# Patient Record
Sex: MALE | Race: WHITE | Employment: FULL TIME | URBAN - METROPOLITAN AREA
[De-identification: names, ages, dates, MRNs, and addresses within clinical notes are randomized per-mention and may not be internally consistent; named-entity substitution may affect disease eponyms.]

---

## 2017-07-08 ENCOUNTER — HOSPITAL ENCOUNTER (OUTPATIENT)
Age: 52
Discharge: HOME | End: 2017-07-08
Attending: EMERGENCY MEDICINE
Payer: COMMERCIAL

## 2017-07-08 VITALS
DIASTOLIC BLOOD PRESSURE: 98 MMHG | TEMPERATURE: 99 F | SYSTOLIC BLOOD PRESSURE: 139 MMHG | WEIGHT: 215 LBS | HEIGHT: 71 IN | RESPIRATION RATE: 16 BRPM | HEART RATE: 106 BPM | BODY MASS INDEX: 30.1 KG/M2

## 2017-07-08 DIAGNOSIS — J40 BRONCHITIS: Primary | ICD-10-CM

## 2017-07-08 PROCEDURE — 99213 OFFICE O/P EST LOW 20 MIN: CPT

## 2017-07-08 PROCEDURE — 99204 OFFICE O/P NEW MOD 45 MIN: CPT

## 2017-07-08 RX ORDER — IBUPROFEN 600 MG/1
600 TABLET ORAL ONCE
Status: COMPLETED | OUTPATIENT
Start: 2017-07-08 | End: 2017-07-08

## 2017-07-08 RX ORDER — METHYLPREDNISOLONE 4 MG/1
TABLET ORAL
Qty: 1 PACKAGE | Refills: 0 | Status: SHIPPED | OUTPATIENT
Start: 2017-07-08 | End: 2018-04-10 | Stop reason: ALTCHOICE

## 2017-07-08 RX ORDER — AZITHROMYCIN 250 MG/1
TABLET, FILM COATED ORAL
Qty: 1 PACKAGE | Refills: 0 | Status: SHIPPED | OUTPATIENT
Start: 2017-07-08 | End: 2017-07-13

## 2017-07-08 NOTE — ED INITIAL ASSESSMENT (HPI)
X1 WK Pt c/o productive cough, headache, sore throat. \"Mucinex helps but it continues to worsen. \"  Denies fevers

## 2017-07-08 NOTE — ED PROVIDER NOTES
Patient presents with:  Cough/URI  Sore Throat    HPI:     Loa Scheuermann is a 45year old male who presents with chief complaint of sore throat, cough, congestion. Former smoker. Started with sore throat, congestion and cough about a week ago.   The cou declined an inhaler and preferred not to take codeine based cough syrup. May continue with mucinex or an antihistamine. May consider robitussin PM as well to aid with cough at night. Assessment/Plan:     Diagnosis:  Bronchitis     Plan:  1.   Medrol

## 2017-08-21 ENCOUNTER — OFFICE VISIT (OUTPATIENT)
Dept: FAMILY MEDICINE CLINIC | Facility: CLINIC | Age: 52
End: 2017-08-21

## 2017-08-21 ENCOUNTER — HOSPITAL ENCOUNTER (OUTPATIENT)
Dept: ULTRASOUND IMAGING | Age: 52
Discharge: HOME | End: 2017-08-21
Attending: FAMILY MEDICINE
Payer: COMMERCIAL

## 2017-08-21 VITALS
TEMPERATURE: 99 F | BODY MASS INDEX: 30 KG/M2 | DIASTOLIC BLOOD PRESSURE: 88 MMHG | HEART RATE: 92 BPM | WEIGHT: 213 LBS | SYSTOLIC BLOOD PRESSURE: 138 MMHG | RESPIRATION RATE: 16 BRPM | OXYGEN SATURATION: 98 %

## 2017-08-21 DIAGNOSIS — N50.812 LEFT TESTICULAR PAIN: ICD-10-CM

## 2017-08-21 DIAGNOSIS — N50.812 LEFT TESTICULAR PAIN: Primary | ICD-10-CM

## 2017-08-21 PROCEDURE — 93975 VASCULAR STUDY: CPT | Performed by: FAMILY MEDICINE

## 2017-08-21 PROCEDURE — 76870 US EXAM SCROTUM: CPT | Performed by: FAMILY MEDICINE

## 2017-08-21 PROCEDURE — 99214 OFFICE O/P EST MOD 30 MIN: CPT | Performed by: FAMILY MEDICINE

## 2017-08-21 NOTE — PROGRESS NOTES
Lidia Gunter is a 45year old male. CC:  Patient presents with:  Pain: pain in groin area for 10 days per pt      HPI:  L testicular pain for 10 days, a bit better over the past 2 days. No hematuria or dysuria. No abd pain. No diarrhea. No fever.  Earnstine Beverage M.D.    Physical Exam:  GEN: well developed, well nourished, in no apparent distress  EYE: B conjunctiva and lids normal  HENT: No oral lesions.    NECK: No lymphadenopathy, thyromegaly or masses  CAR: S1, S2 normal, RRR; no S3, no S4; no click; murmur nega

## 2018-04-09 RX ORDER — LISINOPRIL 10 MG/1
TABLET ORAL
Qty: 30 TABLET | Refills: 3 | Status: SHIPPED | OUTPATIENT
Start: 2018-04-09 | End: 2018-06-28

## 2018-04-10 ENCOUNTER — TELEPHONE (OUTPATIENT)
Dept: FAMILY MEDICINE CLINIC | Facility: CLINIC | Age: 53
End: 2018-04-10

## 2018-04-10 DIAGNOSIS — Z00.00 WELL ADULT EXAM: Primary | ICD-10-CM

## 2018-04-10 DIAGNOSIS — I10 ESSENTIAL HYPERTENSION: ICD-10-CM

## 2018-04-10 DIAGNOSIS — E78.5 HYPERLIPIDEMIA, UNSPECIFIED HYPERLIPIDEMIA TYPE: ICD-10-CM

## 2018-04-13 ENCOUNTER — PRIOR ORIGINAL RECORDS (OUTPATIENT)
Dept: OTHER | Age: 53
End: 2018-04-13

## 2018-04-13 ENCOUNTER — NURSE ONLY (OUTPATIENT)
Dept: FAMILY MEDICINE CLINIC | Facility: CLINIC | Age: 53
End: 2018-04-13

## 2018-04-13 DIAGNOSIS — Z00.00 WELL ADULT EXAM: ICD-10-CM

## 2018-04-13 DIAGNOSIS — E78.5 HYPERLIPIDEMIA, UNSPECIFIED HYPERLIPIDEMIA TYPE: ICD-10-CM

## 2018-04-13 DIAGNOSIS — I10 ESSENTIAL HYPERTENSION: ICD-10-CM

## 2018-04-13 PROCEDURE — 80050 GENERAL HEALTH PANEL: CPT | Performed by: FAMILY MEDICINE

## 2018-04-13 PROCEDURE — 84153 ASSAY OF PSA TOTAL: CPT | Performed by: FAMILY MEDICINE

## 2018-04-13 PROCEDURE — 36415 COLL VENOUS BLD VENIPUNCTURE: CPT | Performed by: FAMILY MEDICINE

## 2018-04-13 PROCEDURE — 80061 LIPID PANEL: CPT | Performed by: FAMILY MEDICINE

## 2018-04-16 ENCOUNTER — TELEPHONE (OUTPATIENT)
Dept: FAMILY MEDICINE CLINIC | Facility: CLINIC | Age: 53
End: 2018-04-16

## 2018-04-16 NOTE — TELEPHONE ENCOUNTER
----- Message from Aaron Murcia MD sent at 4/16/2018  7:56 AM CDT -----  Please let patient know that the prostate, sugar, electrolyte, liver, kidney, and thyroid, tests were fine. There is no anemia and the white blood cell count is fine.    His total ch

## 2018-04-20 ENCOUNTER — TELEPHONE (OUTPATIENT)
Dept: FAMILY MEDICINE CLINIC | Facility: CLINIC | Age: 53
End: 2018-04-20

## 2018-04-20 PROBLEM — R93.1 ABNORMAL HEART SCORE CT: Status: ACTIVE | Noted: 2018-04-20

## 2018-04-20 RX ORDER — ATORVASTATIN CALCIUM 20 MG/1
20 TABLET, FILM COATED ORAL NIGHTLY
Qty: 30 TABLET | Refills: 3 | Status: SHIPPED | OUTPATIENT
Start: 2018-04-20 | End: 2018-08-15

## 2018-04-20 NOTE — TELEPHONE ENCOUNTER
Patient advised of the CT heart score results and recommendations for the ASA and the starting of the Lipitor. Patient would like the prescription sent to [address] [place] [address]. Information for Dr. Orestes Ayala provided to patient.    Information faxed to Dr. West

## 2018-04-20 NOTE — TELEPHONE ENCOUNTER
Please let patient know or leave message that his CT heart score done at [facility] on 4/19/18 was 291. We like to see the score < 10. Based on risk stratification he is considered at moderate to high risk for having coronary artery narrowing.  I recommend he s

## 2018-06-28 ENCOUNTER — OFFICE VISIT (OUTPATIENT)
Dept: FAMILY MEDICINE CLINIC | Facility: CLINIC | Age: 53
End: 2018-06-28

## 2018-06-28 VITALS
DIASTOLIC BLOOD PRESSURE: 100 MMHG | WEIGHT: 218.81 LBS | TEMPERATURE: 97 F | HEIGHT: 69 IN | RESPIRATION RATE: 16 BRPM | BODY MASS INDEX: 32.41 KG/M2 | HEART RATE: 72 BPM | SYSTOLIC BLOOD PRESSURE: 148 MMHG

## 2018-06-28 DIAGNOSIS — R93.1 ABNORMAL HEART SCORE CT: ICD-10-CM

## 2018-06-28 DIAGNOSIS — R07.9 CHEST PAIN, UNSPECIFIED TYPE: ICD-10-CM

## 2018-06-28 DIAGNOSIS — E78.5 HYPERLIPIDEMIA, UNSPECIFIED HYPERLIPIDEMIA TYPE: ICD-10-CM

## 2018-06-28 DIAGNOSIS — I10 HYPERTENSION, UNSPECIFIED TYPE: Primary | ICD-10-CM

## 2018-06-28 PROCEDURE — 99214 OFFICE O/P EST MOD 30 MIN: CPT | Performed by: FAMILY MEDICINE

## 2018-06-28 RX ORDER — LISINOPRIL 10 MG/1
20 TABLET ORAL
COMMUNITY
Start: 2018-06-28 | End: 2018-07-05 | Stop reason: DRUGHIGH

## 2018-06-28 NOTE — PROGRESS NOTES
Irene Umana is a 47year old male. CC:  Patient presents with:   Follow - Up: per pt      HPI:  Here to follow up hypertension  Home BP readings: 120-140/  Medication side effects: none  Chest pain: yes, can occur at rest or exertion, is it n Comment: social       ROS:  General: energy level stable  GI: Denies abdominal pain    Vitals: BP (!) 148/100   Pulse 72   Temp 97.2 °F (36.2 °C) (Temporal)   Resp 16   Ht 69\"   Wt 218 lb 12.8 oz   BMI 32.31 kg/m²    Reviewed by Juan A Kilpatrick M.D.

## 2018-07-02 ENCOUNTER — NURSE ONLY (OUTPATIENT)
Dept: FAMILY MEDICINE CLINIC | Facility: CLINIC | Age: 53
End: 2018-07-02

## 2018-07-02 ENCOUNTER — TELEPHONE (OUTPATIENT)
Dept: FAMILY MEDICINE CLINIC | Facility: CLINIC | Age: 53
End: 2018-07-02

## 2018-07-02 ENCOUNTER — PRIOR ORIGINAL RECORDS (OUTPATIENT)
Dept: OTHER | Age: 53
End: 2018-07-02

## 2018-07-02 DIAGNOSIS — E78.5 HYPERLIPIDEMIA, UNSPECIFIED HYPERLIPIDEMIA TYPE: ICD-10-CM

## 2018-07-02 DIAGNOSIS — I10 HYPERTENSION, UNSPECIFIED TYPE: ICD-10-CM

## 2018-07-02 DIAGNOSIS — R07.9 CHEST PAIN, UNSPECIFIED TYPE: ICD-10-CM

## 2018-07-02 LAB
ALBUMIN SERPL-MCNC: 4 G/DL (ref 3.5–4.8)
ALP LIVER SERPL-CCNC: 60 U/L (ref 45–117)
ALT SERPL-CCNC: 48 U/L (ref 17–63)
AST SERPL-CCNC: 23 U/L (ref 15–41)
BILIRUB SERPL-MCNC: 0.5 MG/DL (ref 0.1–2)
BUN BLD-MCNC: 16 MG/DL (ref 8–20)
CALCIUM BLD-MCNC: 9.2 MG/DL (ref 8.3–10.3)
CHLORIDE: 107 MMOL/L (ref 101–111)
CHOLEST SMN-MCNC: 167 MG/DL (ref ?–200)
CO2: 25 MMOL/L (ref 22–32)
CREAT BLD-MCNC: 1.11 MG/DL (ref 0.7–1.3)
GLUCOSE BLD-MCNC: 99 MG/DL (ref 70–99)
HDLC SERPL-MCNC: 81 MG/DL (ref 45–?)
HDLC SERPL: 2.06 {RATIO} (ref ?–4.97)
LDLC SERPL CALC-MCNC: 70 MG/DL (ref ?–130)
M PROTEIN MFR SERPL ELPH: 7.6 G/DL (ref 6.1–8.3)
NONHDLC SERPL-MCNC: 86 MG/DL (ref ?–130)
POTASSIUM SERPL-SCNC: 4.5 MMOL/L (ref 3.6–5.1)
SODIUM SERPL-SCNC: 141 MMOL/L (ref 136–144)
TRIGL SERPL-MCNC: 80 MG/DL (ref ?–150)
VLDLC SERPL CALC-MCNC: 16 MG/DL (ref 5–40)

## 2018-07-02 PROCEDURE — 36415 COLL VENOUS BLD VENIPUNCTURE: CPT | Performed by: FAMILY MEDICINE

## 2018-07-02 PROCEDURE — 80053 COMPREHEN METABOLIC PANEL: CPT | Performed by: FAMILY MEDICINE

## 2018-07-02 PROCEDURE — 80061 LIPID PANEL: CPT | Performed by: FAMILY MEDICINE

## 2018-07-02 NOTE — PROGRESS NOTES
1 mint tube collected from L AC using straight needle and 1 attempt    Pt tolerated and was sent home in stable condition

## 2018-07-02 NOTE — TELEPHONE ENCOUNTER
ordered Stress Echo, however, order is denied. Test needs to be ordered as a result of High BP or lung issues - MD did not state this is the reasoning for the test. For peer to peer, MD may call [phone] within 10 days.  Will send fax with this info

## 2018-07-02 NOTE — TELEPHONE ENCOUNTER
Mich Gonsales from Dr Melville Libman office is all to get the results from pt's Heart score test. Please Fax over [phone]

## 2018-07-03 ENCOUNTER — MED REC SCAN ONLY (OUTPATIENT)
Dept: FAMILY MEDICINE CLINIC | Facility: CLINIC | Age: 53
End: 2018-07-03

## 2018-07-03 NOTE — TELEPHONE ENCOUNTER
Called [facility] - [phone]. Spoke to Geri. Advised of diagnoses given on original order. Will need to start a new case for this test.    New case started. Does not meet medical criteria. Transferred to nurse reviewer. Spoke to POLY.   States did n

## 2018-07-03 NOTE — TELEPHONE ENCOUNTER
This needs to be reviewed again by whoever tried for the approval. I believe I have 3 or 4 indications that are associated with the order that should caldwell approval from the insurance company.  Thanks

## 2018-07-05 ENCOUNTER — TELEPHONE (OUTPATIENT)
Dept: FAMILY MEDICINE CLINIC | Facility: CLINIC | Age: 53
End: 2018-07-05

## 2018-07-05 RX ORDER — LISINOPRIL 20 MG/1
20 TABLET ORAL DAILY
Qty: 90 TABLET | Refills: 0 | Status: SHIPPED | OUTPATIENT
Start: 2018-07-05 | End: 2018-10-13

## 2018-07-05 NOTE — TELEPHONE ENCOUNTER
Please let patient know or leave message that refills have been sent for the Lisinopril 20 mg. Thanks.

## 2018-07-05 NOTE — TELEPHONE ENCOUNTER
Pt needs a refill of the  lisinopril 10 MG Oral Tab  Should be 20 MG  Sent to [facility] on [address]

## 2018-07-05 NOTE — TELEPHONE ENCOUNTER
Peer to peer done with [employer]  Approval granted, # [id number]  Exp 8/1/2018    Please let Nikki Pinzon know the Stress ECHO was approved.   Thanks

## 2018-07-13 ENCOUNTER — HOSPITAL ENCOUNTER (OUTPATIENT)
Dept: CV DIAGNOSTICS | Age: 53
Discharge: HOME | End: 2018-07-13
Attending: FAMILY MEDICINE
Payer: COMMERCIAL

## 2018-07-13 DIAGNOSIS — E78.5 HYPERLIPIDEMIA, UNSPECIFIED HYPERLIPIDEMIA TYPE: ICD-10-CM

## 2018-07-13 DIAGNOSIS — R93.1 ABNORMAL HEART SCORE CT: ICD-10-CM

## 2018-07-13 DIAGNOSIS — R07.9 CHEST PAIN, UNSPECIFIED TYPE: ICD-10-CM

## 2018-07-13 DIAGNOSIS — I10 HYPERTENSION, UNSPECIFIED TYPE: ICD-10-CM

## 2018-07-13 PROCEDURE — 93350 STRESS TTE ONLY: CPT | Performed by: FAMILY MEDICINE

## 2018-07-13 PROCEDURE — 93018 CV STRESS TEST I&R ONLY: CPT | Performed by: FAMILY MEDICINE

## 2018-07-13 PROCEDURE — 93017 CV STRESS TEST TRACING ONLY: CPT | Performed by: FAMILY MEDICINE

## 2018-07-19 ENCOUNTER — MYAURORA ACCOUNT LINK (OUTPATIENT)
Dept: OTHER | Age: 53
End: 2018-07-19

## 2018-07-19 ENCOUNTER — HOSPITAL ENCOUNTER (OUTPATIENT)
Dept: CV DIAGNOSTICS | Age: 53
Discharge: HOME | End: 2018-07-19
Attending: INTERNAL MEDICINE
Payer: COMMERCIAL

## 2018-07-19 DIAGNOSIS — R07.9 CHEST PAIN, UNSPECIFIED TYPE: ICD-10-CM

## 2018-07-19 DIAGNOSIS — R06.00 DYSPNEA, UNSPECIFIED TYPE: ICD-10-CM

## 2018-07-23 LAB
HEMATOCRIT: 46.5 %
HEMOGLOBIN: 15.8 G/DL
PLATELETS: 289 K/UL
RED BLOOD COUNT: 5.45 X 10-6/U
THYROID STIMULATING HORMONE: 1.66 MLU/L
WHITE BLOOD COUNT: 7.2 X 10-3/U

## 2018-08-04 RX ORDER — LISINOPRIL 10 MG/1
TABLET ORAL
Qty: 30 TABLET | Refills: 2 | OUTPATIENT
Start: 2018-08-04

## 2018-08-10 ENCOUNTER — PRIOR ORIGINAL RECORDS (OUTPATIENT)
Dept: OTHER | Age: 53
End: 2018-08-10

## 2018-08-10 LAB
ALBUMIN: 4 G/DL
ALKALINE PHOSPHATATE(ALK PHOS): 60 IU/L
ALT (SGPT): 48 U/L
AST (SGOT): 23 U/L
BILIRUBIN TOTAL: 0.5 MG/DL
BUN: 16 MG/DL
CALCIUM: 9.2 MG/DL
CHLORIDE: 107 MEQ/L
CHOLESTEROL, TOTAL: 167 MG/DL
CREATININE, SERUM: 1.11 MG/DL
GLUCOSE: 99 MG/DL
GLUCOSE: 99 MG/DL
HDL CHOLESTEROL: 81 MG/DL
LDL CHOLESTEROL: 70 MG/DL
NON-HDL CHOLESTEROL: 86 MG/DL
POTASSIUM, SERUM: 4.5 MEQ/L
PROTEIN, TOTAL: 7.6 G/DL
SGOT (AST): 23 IU/L
SGPT (ALT): 48 IU/L
SODIUM: 141 MEQ/L
TRIGLYCERIDES: 80 MG/DL

## 2018-08-15 RX ORDER — ATORVASTATIN CALCIUM 20 MG/1
TABLET, FILM COATED ORAL
Qty: 90 TABLET | Refills: 1 | Status: SHIPPED | OUTPATIENT
Start: 2018-08-15 | End: 2019-02-09

## 2018-08-15 NOTE — TELEPHONE ENCOUNTER
Pt's wife calling to make sure we have request from [address] [place] [address] on [address] for the refille of Atorvastin, 20 mg (was upped from 10mg). Pt is out and going out of town [facility].

## 2018-08-15 NOTE — TELEPHONE ENCOUNTER
Last refilled on 4/20/18 for # 30 with 3 refills  Last lipids 7/2/18  Last seen on 6/28/18  Future Appointments  Date Time Provider Kourtney Almendarez   9/10/2018 12:45 PM [facility] ROOM 2 [facility] 43        Thank you.

## 2018-09-10 ENCOUNTER — HOSPITAL ENCOUNTER (OUTPATIENT)
Dept: CARDIOLOGY CLINIC | Facility: HOSPITAL | Age: 53
Discharge: HOME | End: 2018-09-10
Attending: INTERNAL MEDICINE

## 2018-09-10 DIAGNOSIS — Z13.6 SCREENING FOR CARDIOVASCULAR CONDITION: ICD-10-CM

## 2018-09-20 ENCOUNTER — PRIOR ORIGINAL RECORDS (OUTPATIENT)
Dept: OTHER | Age: 53
End: 2018-09-20

## 2018-10-13 NOTE — TELEPHONE ENCOUNTER
Last refilled on 7/5/18 for # 90 with 0 refills  Last seen on 6/28/18, /100  No future appointments. Thank you.

## 2018-10-14 RX ORDER — LISINOPRIL 20 MG/1
TABLET ORAL
Qty: 90 TABLET | Refills: 0 | Status: SHIPPED | OUTPATIENT
Start: 2018-10-14 | End: 2018-10-23 | Stop reason: SINTOL

## 2018-10-15 NOTE — TELEPHONE ENCOUNTER
Please let patient know or leave message that his Lisinopril was refilled. He is over due to see us to f/u his BP. Please make him an appt this week.  Thanks

## 2018-10-16 NOTE — TELEPHONE ENCOUNTER
Patient advised of the information per Dr. Humera Lema. Appointment made for next week. Patient unable to come in this week.

## 2018-10-23 ENCOUNTER — OFFICE VISIT (OUTPATIENT)
Dept: FAMILY MEDICINE CLINIC | Facility: CLINIC | Age: 53
End: 2018-10-23
Payer: COMMERCIAL

## 2018-10-23 VITALS
HEART RATE: 70 BPM | TEMPERATURE: 98 F | SYSTOLIC BLOOD PRESSURE: 126 MMHG | DIASTOLIC BLOOD PRESSURE: 88 MMHG | WEIGHT: 225 LBS | BODY MASS INDEX: 33 KG/M2 | RESPIRATION RATE: 14 BRPM

## 2018-10-23 DIAGNOSIS — T46.4X5A ACE-INHIBITOR COUGH: ICD-10-CM

## 2018-10-23 DIAGNOSIS — I10 ESSENTIAL HYPERTENSION: Primary | ICD-10-CM

## 2018-10-23 DIAGNOSIS — R05.8 ACE-INHIBITOR COUGH: ICD-10-CM

## 2018-10-23 PROCEDURE — 99214 OFFICE O/P EST MOD 30 MIN: CPT | Performed by: FAMILY MEDICINE

## 2018-10-23 RX ORDER — LOSARTAN POTASSIUM 50 MG/1
50 TABLET ORAL DAILY
Qty: 90 TABLET | Refills: 3 | Status: SHIPPED | OUTPATIENT
Start: 2018-10-23 | End: 2018-11-08

## 2018-10-23 NOTE — PROGRESS NOTES
Toni Hsieh is a 47year old male. CC:  Patient presents with:   Follow - Up: per pt      HPI:  Here to follow up hypertension  Home BP readings: 130s/80s  Medication side effects: clearing of throat and dry cough  Chest pain: none  Headaches: none palpitations, tachycardia, irregular heart beat, chest pain  Respiratory: Denies dyspnea, dyspnea on exertion    Vitals: /88   Pulse 70   Temp 97.7 °F (36.5 °C) (Temporal)   Resp 14   Wt 225 lb   BMI 33.23 kg/m²    Reviewed by Jorge Luis M.D.     y

## 2018-11-13 ENCOUNTER — TELEPHONE (OUTPATIENT)
Dept: FAMILY MEDICINE CLINIC | Facility: CLINIC | Age: 53
End: 2018-11-13

## 2018-11-13 RX ORDER — LOSARTAN POTASSIUM 100 MG/1
100 TABLET ORAL DAILY
Qty: 90 TABLET | Refills: 1 | Status: SHIPPED | OUTPATIENT
Start: 2018-11-13 | End: 2018-12-13

## 2018-11-13 NOTE — TELEPHONE ENCOUNTER
PT STOPPED IN AND ADV THAT PT P/U SCRIPT OF LISINOPRIL 20MG. PT WAS CONFUSED CAUSE HE THOUGHT HE WAS SUPPOSE TO BE ON LOSARTAN. LOOKS LIKE PHARMACY HAD OLD SCRIPT OF LISINOPRIL (HAS NOT TAKEN) PT HAS DOUBLED UP ON LASARTAN.       PT DROPPED OF READING

## 2018-11-13 NOTE — TELEPHONE ENCOUNTER
Patient advised of the information and recommendations per Dr. Alonso Rodriguez. Patient verbalized understanding.

## 2018-11-21 ENCOUNTER — PRIOR ORIGINAL RECORDS (OUTPATIENT)
Dept: OTHER | Age: 53
End: 2018-11-21

## 2018-12-13 ENCOUNTER — OFFICE VISIT (OUTPATIENT)
Dept: FAMILY MEDICINE CLINIC | Facility: CLINIC | Age: 53
End: 2018-12-13
Payer: COMMERCIAL

## 2018-12-13 VITALS
BODY MASS INDEX: 34 KG/M2 | HEART RATE: 86 BPM | DIASTOLIC BLOOD PRESSURE: 86 MMHG | TEMPERATURE: 97 F | OXYGEN SATURATION: 99 % | SYSTOLIC BLOOD PRESSURE: 134 MMHG | WEIGHT: 227 LBS

## 2018-12-13 DIAGNOSIS — I10 ESSENTIAL HYPERTENSION: ICD-10-CM

## 2018-12-13 DIAGNOSIS — R05.8 PRODUCTIVE COUGH: Primary | ICD-10-CM

## 2018-12-13 PROCEDURE — 99214 OFFICE O/P EST MOD 30 MIN: CPT | Performed by: FAMILY MEDICINE

## 2018-12-13 RX ORDER — AZITHROMYCIN 250 MG/1
TABLET, FILM COATED ORAL
Qty: 6 TABLET | Refills: 0 | Status: SHIPPED | OUTPATIENT
Start: 2018-12-13 | End: 2018-12-18

## 2018-12-13 RX ORDER — LOSARTAN POTASSIUM AND HYDROCHLOROTHIAZIDE 12.5; 1 MG/1; MG/1
1 TABLET ORAL DAILY
Qty: 90 TABLET | Refills: 0 | Status: SHIPPED | OUTPATIENT
Start: 2018-12-13 | End: 2019-03-03

## 2018-12-13 NOTE — PROGRESS NOTES
Zaira Woods is a 46year old male. CC:  Patient presents with:  Cough: per pt  Sore Throat  Headache      HPI:  The patient has primary complaint of productive cough for  4 days.  Associated symptoms include fever, headache in L frontal area and na social    Drug use: No       ROS:  General: lower energy  GI: Denies diarrhea    Vitals: /86   Pulse 86   Temp 97 °F (36.1 °C) (Temporal)   Wt 227 lb   SpO2 99%   BMI 33.52 kg/m²    Reviewed by Ruben Marshall M.D.     Physical Exam:  GEN: well developed, two tablets by mouth today, then one tablet daily for 4 days   • Losartan Potassium-HCTZ 100-12.5 MG Oral Tab 90 tablet 0     Sig: Take 1 tablet by mouth daily. No Follow-up on file.       Authorized by Biju Daniel M.D.

## 2018-12-26 ENCOUNTER — HOSPITAL ENCOUNTER (OUTPATIENT)
Dept: CV DIAGNOSTICS | Age: 53
Discharge: HOME | End: 2018-12-26
Attending: INTERNAL MEDICINE

## 2018-12-26 ENCOUNTER — MYAURORA ACCOUNT LINK (OUTPATIENT)
Dept: OTHER | Age: 53
End: 2018-12-26

## 2018-12-26 ENCOUNTER — PRIOR ORIGINAL RECORDS (OUTPATIENT)
Dept: OTHER | Age: 53
End: 2018-12-26

## 2018-12-26 DIAGNOSIS — I65.23 BILATERAL CAROTID ARTERY STENOSIS: ICD-10-CM

## 2018-12-28 ENCOUNTER — MED REC SCAN ONLY (OUTPATIENT)
Dept: FAMILY MEDICINE CLINIC | Facility: CLINIC | Age: 53
End: 2018-12-28

## 2019-01-18 ENCOUNTER — PRIOR ORIGINAL RECORDS (OUTPATIENT)
Dept: OTHER | Age: 54
End: 2019-01-18

## 2019-01-18 ENCOUNTER — MYAURORA ACCOUNT LINK (OUTPATIENT)
Dept: OTHER | Age: 54
End: 2019-01-18

## 2019-02-09 NOTE — TELEPHONE ENCOUNTER
Last refilled on 8/15/18 for # 90 with 1 rf. Last labs 7/2/18. Last seen on 12/13/18. No future appointments. Thank you.

## 2019-02-10 RX ORDER — ATORVASTATIN CALCIUM 20 MG/1
TABLET, FILM COATED ORAL
Qty: 90 TABLET | Refills: 1 | Status: SHIPPED | OUTPATIENT
Start: 2019-02-10 | End: 2019-07-12

## 2019-02-11 ENCOUNTER — MED REC SCAN ONLY (OUTPATIENT)
Dept: FAMILY MEDICINE CLINIC | Facility: CLINIC | Age: 54
End: 2019-02-11

## 2019-02-28 VITALS
HEART RATE: 80 BPM | HEIGHT: 70 IN | WEIGHT: 193 LBS | DIASTOLIC BLOOD PRESSURE: 68 MMHG | SYSTOLIC BLOOD PRESSURE: 106 MMHG | BODY MASS INDEX: 27.63 KG/M2

## 2019-02-28 VITALS
BODY MASS INDEX: 31.21 KG/M2 | WEIGHT: 218 LBS | SYSTOLIC BLOOD PRESSURE: 126 MMHG | HEART RATE: 80 BPM | HEIGHT: 70 IN | DIASTOLIC BLOOD PRESSURE: 80 MMHG

## 2019-03-04 RX ORDER — LOSARTAN POTASSIUM AND HYDROCHLOROTHIAZIDE 12.5; 1 MG/1; MG/1
1 TABLET ORAL DAILY
Qty: 90 TABLET | Refills: 1 | Status: SHIPPED | OUTPATIENT
Start: 2019-03-04 | End: 2019-06-26

## 2019-03-04 NOTE — TELEPHONE ENCOUNTER
Last refills: 12/13/18 #90 w/ 0 refills  Last OV: 12/13/18  Last labs: 7/2/18    BP Readings from Last 3 Encounters:  12/13/18 : 134/86  10/23/18 : 126/88  06/28/18 : (!) 148/100        No future appointments.

## 2019-04-22 RX ORDER — LOSARTAN POTASSIUM AND HYDROCHLOROTHIAZIDE 12.5; 1 MG/1; MG/1
TABLET ORAL
COMMUNITY
Start: 2019-01-18 | End: 2021-01-22

## 2019-04-22 RX ORDER — ATORVASTATIN CALCIUM 20 MG/1
TABLET, FILM COATED ORAL
COMMUNITY
Start: 2018-07-02 | End: 2020-02-05 | Stop reason: SDUPTHER

## 2019-06-26 ENCOUNTER — OFFICE VISIT (OUTPATIENT)
Dept: FAMILY MEDICINE CLINIC | Facility: CLINIC | Age: 54
End: 2019-06-26
Payer: COMMERCIAL

## 2019-06-26 VITALS
RESPIRATION RATE: 14 BRPM | DIASTOLIC BLOOD PRESSURE: 76 MMHG | TEMPERATURE: 99 F | OXYGEN SATURATION: 100 % | HEART RATE: 86 BPM | WEIGHT: 193.81 LBS | SYSTOLIC BLOOD PRESSURE: 120 MMHG | BODY MASS INDEX: 28.38 KG/M2 | HEIGHT: 69.25 IN

## 2019-06-26 DIAGNOSIS — I10 ESSENTIAL HYPERTENSION: Primary | ICD-10-CM

## 2019-06-26 DIAGNOSIS — E78.5 HYPERLIPIDEMIA, UNSPECIFIED HYPERLIPIDEMIA TYPE: ICD-10-CM

## 2019-06-26 PROCEDURE — 99214 OFFICE O/P EST MOD 30 MIN: CPT | Performed by: FAMILY MEDICINE

## 2019-06-26 RX ORDER — LOSARTAN POTASSIUM AND HYDROCHLOROTHIAZIDE 12.5; 1 MG/1; MG/1
0.5 TABLET ORAL DAILY
COMMUNITY
Start: 2019-06-26 | End: 2019-07-12 | Stop reason: ALTCHOICE

## 2019-06-26 NOTE — PROGRESS NOTES
Juanita Hernandez is a 46year old male. CC:  Patient presents with:  Weight Loss: per pt- talk about blood pressure & chloesterol meds      HPI:  F/u HTN and elevated lipids.  He has lost close to 40 lbs in the past 7 months and is wondering if still ne BMI 28.41 kg/m²    Reviewed by Mateo Deras M.D.     Physical Exam:  GEN: well developed, well nourished, in no apparent distress  EYE: B conjunctiva and lids normal  HENT: normocephalic; normal nose, pharynx and TM's  NECK: No lymphadenopathy, thyromegaly o

## 2019-07-12 ENCOUNTER — NURSE ONLY (OUTPATIENT)
Dept: FAMILY MEDICINE CLINIC | Facility: CLINIC | Age: 54
End: 2019-07-12
Payer: COMMERCIAL

## 2019-07-12 VITALS — SYSTOLIC BLOOD PRESSURE: 126 MMHG | DIASTOLIC BLOOD PRESSURE: 86 MMHG

## 2019-07-12 DIAGNOSIS — E78.5 HYPERLIPIDEMIA, UNSPECIFIED HYPERLIPIDEMIA TYPE: ICD-10-CM

## 2019-07-12 DIAGNOSIS — E78.5 HYPERLIPIDEMIA, UNSPECIFIED HYPERLIPIDEMIA TYPE: Primary | ICD-10-CM

## 2019-07-12 LAB
CHOLEST SMN-MCNC: 172 MG/DL (ref ?–200)
HDLC SERPL-MCNC: 97 MG/DL (ref 40–59)
LDLC SERPL CALC-MCNC: 60 MG/DL (ref ?–100)
NONHDLC SERPL-MCNC: 75 MG/DL (ref ?–130)
TRIGL SERPL-MCNC: 75 MG/DL (ref 30–149)
VLDLC SERPL CALC-MCNC: 15 MG/DL (ref 0–30)

## 2019-07-12 PROCEDURE — 36415 COLL VENOUS BLD VENIPUNCTURE: CPT | Performed by: FAMILY MEDICINE

## 2019-07-12 PROCEDURE — 80061 LIPID PANEL: CPT | Performed by: FAMILY MEDICINE

## 2019-07-12 RX ORDER — LOSARTAN POTASSIUM 50 MG/1
50 TABLET ORAL DAILY
Qty: 90 TABLET | Refills: 0 | Status: SHIPPED | OUTPATIENT
Start: 2019-07-12 | End: 2019-07-15

## 2019-07-12 NOTE — PROGRESS NOTES
Patient to clinic for labs and BP check for TJ. Mint tube drawn right AC x 1 attempt    Patient reports taking Losartan/hctz 1/2 tab for the last 10 days or so  /86    Dr Clara Sanon notified.  Per v/o Dr Clara Sanon, send script for losartan 50 mg #90  0 re

## 2019-07-15 ENCOUNTER — TELEPHONE (OUTPATIENT)
Dept: FAMILY MEDICINE CLINIC | Facility: CLINIC | Age: 54
End: 2019-07-15

## 2019-07-15 RX ORDER — LOSARTAN POTASSIUM 50 MG/1
50 TABLET ORAL DAILY
Qty: 90 TABLET | Refills: 0 | Status: SHIPPED | OUTPATIENT
Start: 2019-07-15 | End: 2019-10-05

## 2019-07-15 NOTE — TELEPHONE ENCOUNTER
Patient states that Dr Maxi Jj upped his BP medication to losartan Potassium 50 MG Oral Tab and sent a new script to the [facility] in [place]. Patient forgot to pick it up before he went out of town.  Can Dr Maxi Jj cancel the script at [facility] and send it to :

## 2019-10-05 RX ORDER — LOSARTAN POTASSIUM 50 MG/1
TABLET ORAL
Qty: 90 TABLET | Refills: 2 | Status: SHIPPED | OUTPATIENT
Start: 2019-10-05 | End: 2020-05-14

## 2020-01-23 RX ORDER — LISINOPRIL 10 MG/1
TABLET ORAL
COMMUNITY
Start: 2018-07-02 | End: 2021-01-22

## 2020-01-24 ENCOUNTER — OFFICE VISIT (OUTPATIENT)
Dept: CARDIOLOGY | Age: 55
End: 2020-01-24

## 2020-01-24 VITALS
HEART RATE: 82 BPM | SYSTOLIC BLOOD PRESSURE: 126 MMHG | HEIGHT: 71 IN | WEIGHT: 209 LBS | DIASTOLIC BLOOD PRESSURE: 80 MMHG | BODY MASS INDEX: 29.26 KG/M2

## 2020-01-24 DIAGNOSIS — E78.2 HYPERLIPIDEMIA, MIXED: ICD-10-CM

## 2020-01-24 DIAGNOSIS — R94.31 ABNORMAL EKG: ICD-10-CM

## 2020-01-24 DIAGNOSIS — I25.10 CORONARY ARTERY CALCIFICATION SEEN ON CT SCAN: Primary | ICD-10-CM

## 2020-01-24 DIAGNOSIS — I65.23 ASYMPTOMATIC CAROTID ARTERY STENOSIS, BILATERAL: ICD-10-CM

## 2020-01-24 DIAGNOSIS — R07.89 OTHER CHEST PAIN: ICD-10-CM

## 2020-01-24 DIAGNOSIS — I10 ESSENTIAL HYPERTENSION: ICD-10-CM

## 2020-01-24 PROCEDURE — 99214 OFFICE O/P EST MOD 30 MIN: CPT | Performed by: INTERNAL MEDICINE

## 2020-01-24 PROCEDURE — 3079F DIAST BP 80-89 MM HG: CPT | Performed by: INTERNAL MEDICINE

## 2020-01-24 PROCEDURE — 3074F SYST BP LT 130 MM HG: CPT | Performed by: INTERNAL MEDICINE

## 2020-01-24 RX ORDER — LOSARTAN POTASSIUM 50 MG/1
50 TABLET ORAL DAILY
COMMUNITY

## 2020-01-24 ASSESSMENT — ENCOUNTER SYMPTOMS
CHILLS: 0
BRUISES/BLEEDS EASILY: 0
COUGH: 0
WEIGHT LOSS: 0
SUSPICIOUS LESIONS: 0
WEIGHT GAIN: 0
HEMATOCHEZIA: 0
ALLERGIC/IMMUNOLOGIC COMMENTS: NO NEW FOOD ALLERGIES
HEMOPTYSIS: 0
FEVER: 0

## 2020-01-24 ASSESSMENT — PATIENT HEALTH QUESTIONNAIRE - PHQ9
1. LITTLE INTEREST OR PLEASURE IN DOING THINGS: NOT AT ALL
2. FEELING DOWN, DEPRESSED OR HOPELESS: NOT AT ALL
SUM OF ALL RESPONSES TO PHQ9 QUESTIONS 1 AND 2: 0
SUM OF ALL RESPONSES TO PHQ9 QUESTIONS 1 AND 2: 0

## 2020-01-28 ENCOUNTER — MED REC SCAN ONLY (OUTPATIENT)
Dept: FAMILY MEDICINE CLINIC | Facility: CLINIC | Age: 55
End: 2020-01-28

## 2020-01-30 LAB
ALBUMIN SERPL-MCNC: 4.7 G/DL (ref 3.6–5.1)
ALBUMIN/GLOB SERPL: 1.7 (CALC) (ref 1–2.5)
ALP SERPL-CCNC: 56 U/L (ref 40–115)
ALT SERPL-CCNC: 32 U/L (ref 9–46)
AST SERPL-CCNC: 23 U/L (ref 10–35)
BILIRUB SERPL-MCNC: 0.6 MG/DL (ref 0.2–1.2)
BUN SERPL-MCNC: 17 MG/DL (ref 7–25)
BUN/CREAT SERPL: NORMAL (CALC) (ref 6–22)
CALCIUM SERPL-MCNC: 9.9 MG/DL (ref 8.6–10.3)
CHLORIDE SERPL-SCNC: 102 MMOL/L (ref 98–110)
CHOLEST SERPL-MCNC: 274 MG/DL
CHOLEST/HDLC SERPL: 2.9 (CALC)
CO2 SERPL-SCNC: 29 MMOL/L (ref 20–32)
CREAT SERPL-MCNC: 0.96 MG/DL (ref 0.7–1.33)
GFRSERPLBLD MDRD-ARVRAT: 89 ML/MIN/1.73M2
GLOBULIN SER CALC-MCNC: 2.7 G/DL (CALC) (ref 1.9–3.7)
GLUCOSE SERPL-MCNC: 95 MG/DL (ref 65–99)
HDLC SERPL-MCNC: 93 MG/DL
LDLC SERPL CALC-MCNC: 159 MG/DL (CALC)
NONHDLC SERPL-MCNC: 181 MG/DL (CALC)
POTASSIUM SERPL-SCNC: 5.2 MMOL/L (ref 3.5–5.3)
PROT SERPL-MCNC: 7.4 G/DL (ref 6.1–8.1)
SODIUM SERPL-SCNC: 140 MMOL/L (ref 135–146)
TRIGL SERPL-MCNC: 102 MG/DL

## 2020-01-31 ENCOUNTER — TELEPHONE (OUTPATIENT)
Dept: CARDIOLOGY | Age: 55
End: 2020-01-31

## 2020-01-31 DIAGNOSIS — I25.10 CORONARY ARTERY CALCIFICATION SEEN ON CT SCAN: Primary | ICD-10-CM

## 2020-01-31 DIAGNOSIS — E78.2 HYPERLIPIDEMIA, MIXED: ICD-10-CM

## 2020-01-31 DIAGNOSIS — I10 ESSENTIAL HYPERTENSION: ICD-10-CM

## 2020-01-31 DIAGNOSIS — I65.23 ASYMPTOMATIC CAROTID ARTERY STENOSIS, BILATERAL: ICD-10-CM

## 2020-02-05 RX ORDER — ATORVASTATIN CALCIUM 20 MG/1
20 TABLET, FILM COATED ORAL DAILY
Qty: 30 TABLET | Refills: 2 | Status: SHIPPED | OUTPATIENT
Start: 2020-02-05 | End: 2020-05-14

## 2020-02-06 ENCOUNTER — MED REC SCAN ONLY (OUTPATIENT)
Dept: FAMILY MEDICINE CLINIC | Facility: CLINIC | Age: 55
End: 2020-02-06

## 2020-05-13 RX ORDER — LOSARTAN POTASSIUM 50 MG/1
50 TABLET ORAL DAILY
Qty: 90 TABLET | Refills: 2 | OUTPATIENT
Start: 2020-05-13

## 2020-05-13 RX ORDER — ATORVASTATIN CALCIUM 20 MG/1
TABLET, FILM COATED ORAL
COMMUNITY
Start: 2020-03-24 | End: 2020-05-14

## 2020-05-13 NOTE — TELEPHONE ENCOUNTER
Per prescribing guidelines, I need to show continuity and oversight of care for all prescriptions. This is something we would typically want to see in office.  Given the COVID crisis we are doing visit with patients via Video visits, [address] visits and te

## 2020-05-13 NOTE — TELEPHONE ENCOUNTER
LOV: 6/26/19   Last Refill: 10/5/19 #90 2 RF    No future appointments.     BP Readings from Last 2 Encounters:  07/12/19 : 126/86  06/26/19 : 120/76    Lab Results   Component Value Date    GLU 99 07/02/2018    BUN 16 07/02/2018    CREATSERUM 1.11 07/02/20

## 2020-05-14 RX ORDER — LOSARTAN POTASSIUM 50 MG/1
50 TABLET ORAL DAILY
Qty: 90 TABLET | Refills: 0 | Status: SHIPPED | OUTPATIENT
Start: 2020-05-14 | End: 2020-05-14

## 2020-05-14 RX ORDER — LOSARTAN POTASSIUM 50 MG/1
50 TABLET ORAL DAILY
Qty: 90 TABLET | Refills: 0 | Status: SHIPPED | OUTPATIENT
Start: 2020-05-14 | End: 2021-07-03

## 2020-05-14 RX ORDER — ATORVASTATIN CALCIUM 20 MG/1
20 TABLET, FILM COATED ORAL NIGHTLY
Qty: 90 TABLET | Refills: 0 | Status: SHIPPED | OUTPATIENT
Start: 2020-05-14 | End: 2020-11-16

## 2020-05-14 RX ORDER — ATORVASTATIN CALCIUM 20 MG/1
20 TABLET, FILM COATED ORAL DAILY
Qty: 90 TABLET | Refills: 0 | Status: SHIPPED | OUTPATIENT
Start: 2020-05-14 | End: 2020-06-15 | Stop reason: SDUPTHER

## 2020-05-14 NOTE — TELEPHONE ENCOUNTER
Advised. Verbalized understanding. Send Rx to [facility] in [facility] [address] please. Updated in [facility] Carina Uribe verbally consents to a Virtual/Telephone Check-In service on 5/18/20.   Patient understands and accepts financial responsibility f

## 2020-05-14 NOTE — TELEPHONE ENCOUNTER
[id number] - [place], [address] [phone], [phone]  atorvastatin 20 MG Oral Tab  losartan Potassium 50 MG Oral Tab

## 2020-05-14 NOTE — TELEPHONE ENCOUNTER
Patient states would like both sent to [facility] in [place]-not in [place]. Losartan canceled with [facility]-sent to [facility].    Patient states also almost out of atorvastatin. Has video appt for Monday.   Ok to send refill to [place]?

## 2020-05-14 NOTE — TELEPHONE ENCOUNTER
EV-his wife told us earlier to send refills to [facility] in [place].  Does he want them sent there or [place] ?

## 2020-05-14 NOTE — TELEPHONE ENCOUNTER
A virtual visit on Monday is fine. Does he need us to send rx to [place]. If so we need the pharmacy info.  Thanks

## 2020-05-14 NOTE — TELEPHONE ENCOUNTER
Spoke to Olga Arambula, he is currently in [place] and has two pills left, will be back Saturday and leaving again on Tuesday, only available Monday to do visit. Is this ok? Please advise  Thanks!

## 2020-05-18 ENCOUNTER — TELEMEDICINE (OUTPATIENT)
Dept: FAMILY MEDICINE CLINIC | Facility: CLINIC | Age: 55
End: 2020-05-18
Payer: COMMERCIAL

## 2020-05-18 DIAGNOSIS — I10 ESSENTIAL HYPERTENSION: Primary | ICD-10-CM

## 2020-05-18 DIAGNOSIS — E78.5 HYPERLIPIDEMIA, UNSPECIFIED HYPERLIPIDEMIA TYPE: ICD-10-CM

## 2020-05-18 DIAGNOSIS — R93.1 ABNORMAL HEART SCORE CT: ICD-10-CM

## 2020-05-18 PROCEDURE — 99214 OFFICE O/P EST MOD 30 MIN: CPT | Performed by: FAMILY MEDICINE

## 2020-05-18 NOTE — PROGRESS NOTES
My Chart/ Video/Telephone Visit Check-In Due to [address] verbally consents to a Pica8 on 05/18/20.   Patient understands and accepts financial responsibility for any deductible, co-insurance and/or co-pays associa Take 81 mg by mouth.  1 qd          History:  Past Medical History:   Diagnosis Date   • Abnormal Heart Score CT 4/20/2018    291, done at [facility]   • GERD (gastroesophageal reflux disease)    • Hyperlipidemia    • Hypertension    • Melanoma ([address]    • PUD ( the following visit was completed using two-way, real-time interactive audio and/or video communication.   This has been done in good kapil to provide continuity of care in the best interest of the provider-patient relationship, due to the ongoing public he

## 2020-05-22 ENCOUNTER — PATIENT MESSAGE (OUTPATIENT)
Dept: FAMILY MEDICINE CLINIC | Facility: CLINIC | Age: 55
End: 2020-05-22

## 2020-05-22 RX ORDER — AMLODIPINE BESYLATE 2.5 MG/1
2.5 TABLET ORAL DAILY
Qty: 90 TABLET | Refills: 0 | Status: SHIPPED | OUTPATIENT
Start: 2020-05-22 | End: 2020-07-14

## 2020-06-12 LAB
ALT SERPL-CCNC: 28 U/L (ref 9–46)
AST SERPL-CCNC: 21 U/L (ref 10–35)
CHOLEST SERPL-MCNC: 176 MG/DL
CHOLEST/HDLC SERPL: 2 (CALC)
HDLC SERPL-MCNC: 90 MG/DL
LDLC SERPL CALC-MCNC: 71 MG/DL (CALC)
NONHDLC SERPL-MCNC: 86 MG/DL (CALC)
TRIGL SERPL-MCNC: 71 MG/DL

## 2020-06-15 ENCOUNTER — TELEPHONE (OUTPATIENT)
Dept: CARDIOLOGY | Age: 55
End: 2020-06-15

## 2020-06-15 RX ORDER — ATORVASTATIN CALCIUM 20 MG/1
20 TABLET, FILM COATED ORAL DAILY
Qty: 90 TABLET | Refills: 3 | Status: SHIPPED | OUTPATIENT
Start: 2020-06-15

## 2020-06-30 ENCOUNTER — TELEPHONE (OUTPATIENT)
Dept: FAMILY MEDICINE CLINIC | Facility: CLINIC | Age: 55
End: 2020-06-30

## 2020-06-30 DIAGNOSIS — Z20.822 CLOSE EXPOSURE TO COVID-19 VIRUS: Primary | ICD-10-CM

## 2020-06-30 NOTE — TELEPHONE ENCOUNTER
Patient states his mother was at his house for fathers day weekend, left that Monday (6/22)    States she was tested for covid for a procedure after that weekend and was negative.     Needed another procedure and received a second covid test that was positi

## 2020-07-01 ENCOUNTER — LAB ENCOUNTER (OUTPATIENT)
Dept: LAB | Facility: HOSPITAL | Age: 55
End: 2020-07-01
Attending: FAMILY MEDICINE
Payer: COMMERCIAL

## 2020-07-01 DIAGNOSIS — Z20.822 CLOSE EXPOSURE TO COVID-19 VIRUS: ICD-10-CM

## 2020-07-02 LAB — SARS-COV-2 RNA RESP QL NAA+PROBE: NOT DETECTED

## 2020-07-14 RX ORDER — AMLODIPINE BESYLATE 2.5 MG/1
TABLET ORAL
Qty: 90 TABLET | Refills: 0 | Status: SHIPPED | OUTPATIENT
Start: 2020-07-14 | End: 2020-08-30

## 2020-07-14 NOTE — TELEPHONE ENCOUNTER
Hypertension Medications Protocol Failed7/14 11:12 AM   CMP or BMP in past 12 months    Appointment in past 6 or next 3 months    Last serum creatinine< 2.0     Last OV 6/26/19  Last lab 1/29/2020 CMP/creatinine 0.96 (see 2/14/2020 scan)  Last refilled 5/2

## 2020-08-31 RX ORDER — AMLODIPINE BESYLATE 5 MG/1
5 TABLET ORAL DAILY
Qty: 90 TABLET | Refills: 0 | Status: SHIPPED | OUTPATIENT
Start: 2020-08-31 | End: 2020-12-01

## 2020-08-31 RX ORDER — AMLODIPINE BESYLATE 2.5 MG/1
5 TABLET ORAL DAILY
COMMUNITY
End: 2020-09-16

## 2020-08-31 NOTE — TELEPHONE ENCOUNTER
Please let patient or caregiver know or leave message that I got his on line request for amlodipine refill. I want to confirm that he is taking amlodipine 2.5 mg, 2 qd.  If he is then we can send in Amlodipine 5 mg, 1 qd, #90, 0 rf  Also I had wanted to see

## 2020-08-31 NOTE — TELEPHONE ENCOUNTER
Patient notified and verbalized understanding. States he is taking amlodipine 2.5 mg, two tabs daily. Aware script will be sent for 5 mg, one tab daily. Script sent    Patient states he had covid exposure 2 weeks ago.   No symptoms  appt scheduled    Fut

## 2020-09-16 ENCOUNTER — OFFICE VISIT (OUTPATIENT)
Dept: FAMILY MEDICINE CLINIC | Facility: CLINIC | Age: 55
End: 2020-09-16
Payer: COMMERCIAL

## 2020-09-16 ENCOUNTER — LAB ENCOUNTER (OUTPATIENT)
Dept: LAB | Age: 55
End: 2020-09-16
Attending: FAMILY MEDICINE
Payer: COMMERCIAL

## 2020-09-16 VITALS
HEART RATE: 90 BPM | TEMPERATURE: 97 F | BODY MASS INDEX: 29.72 KG/M2 | DIASTOLIC BLOOD PRESSURE: 88 MMHG | SYSTOLIC BLOOD PRESSURE: 132 MMHG | RESPIRATION RATE: 15 BRPM | HEIGHT: 70 IN | WEIGHT: 207.63 LBS | OXYGEN SATURATION: 97 %

## 2020-09-16 DIAGNOSIS — E78.5 HYPERLIPIDEMIA, UNSPECIFIED HYPERLIPIDEMIA TYPE: ICD-10-CM

## 2020-09-16 DIAGNOSIS — Z00.00 WELL ADULT EXAM: Primary | ICD-10-CM

## 2020-09-16 DIAGNOSIS — Z00.00 WELL ADULT EXAM: ICD-10-CM

## 2020-09-16 DIAGNOSIS — R93.1 ABNORMAL HEART SCORE CT: ICD-10-CM

## 2020-09-16 DIAGNOSIS — I10 ESSENTIAL HYPERTENSION: ICD-10-CM

## 2020-09-16 LAB
ALT SERPL-CCNC: 62 U/L (ref 16–61)
ANION GAP SERPL CALC-SCNC: 3 MMOL/L (ref 0–18)
AST SERPL-CCNC: 27 U/L (ref 15–37)
BUN BLD-MCNC: 15 MG/DL (ref 7–18)
BUN/CREAT SERPL: 15 (ref 10–20)
CALCIUM BLD-MCNC: 9.3 MG/DL (ref 8.5–10.1)
CHLORIDE SERPL-SCNC: 105 MMOL/L (ref 98–112)
CO2 SERPL-SCNC: 30 MMOL/L (ref 21–32)
COMPLEXED PSA SERPL-MCNC: 0.74 NG/ML (ref ?–4)
CREAT BLD-MCNC: 1 MG/DL (ref 0.7–1.3)
DEPRECATED RDW RBC AUTO: 44.7 FL (ref 35.1–46.3)
ERYTHROCYTE [DISTWIDTH] IN BLOOD BY AUTOMATED COUNT: 13.6 % (ref 11–15)
GLUCOSE BLD-MCNC: 99 MG/DL (ref 70–99)
HCT VFR BLD AUTO: 49.9 % (ref 39–53)
HGB BLD-MCNC: 16 G/DL (ref 13–17.5)
MCH RBC QN AUTO: 28.8 PG (ref 26–34)
MCHC RBC AUTO-ENTMCNC: 32.1 G/DL (ref 31–37)
MCV RBC AUTO: 89.7 FL (ref 80–100)
OSMOLALITY SERPL CALC.SUM OF ELEC: 287 MOSM/KG (ref 275–295)
PATIENT FASTING Y/N/NP: YES
PLATELET # BLD AUTO: 356 10(3)UL (ref 150–450)
POTASSIUM SERPL-SCNC: 4.8 MMOL/L (ref 3.5–5.1)
RBC # BLD AUTO: 5.56 X10(6)UL (ref 4.3–5.7)
SODIUM SERPL-SCNC: 138 MMOL/L (ref 136–145)
TSI SER-ACNC: 2.01 MIU/ML (ref 0.36–3.74)
WBC # BLD AUTO: 9.9 X10(3) UL (ref 4–11)

## 2020-09-16 PROCEDURE — 80048 BASIC METABOLIC PNL TOTAL CA: CPT | Performed by: FAMILY MEDICINE

## 2020-09-16 PROCEDURE — 3079F DIAST BP 80-89 MM HG: CPT | Performed by: FAMILY MEDICINE

## 2020-09-16 PROCEDURE — 3075F SYST BP GE 130 - 139MM HG: CPT | Performed by: FAMILY MEDICINE

## 2020-09-16 PROCEDURE — 99396 PREV VISIT EST AGE 40-64: CPT | Performed by: FAMILY MEDICINE

## 2020-09-16 PROCEDURE — 84450 TRANSFERASE (AST) (SGOT): CPT | Performed by: FAMILY MEDICINE

## 2020-09-16 PROCEDURE — 85027 COMPLETE CBC AUTOMATED: CPT | Performed by: FAMILY MEDICINE

## 2020-09-16 PROCEDURE — 84460 ALANINE AMINO (ALT) (SGPT): CPT | Performed by: FAMILY MEDICINE

## 2020-09-16 PROCEDURE — 36415 COLL VENOUS BLD VENIPUNCTURE: CPT | Performed by: FAMILY MEDICINE

## 2020-09-16 PROCEDURE — 84443 ASSAY THYROID STIM HORMONE: CPT | Performed by: FAMILY MEDICINE

## 2020-09-16 PROCEDURE — 3008F BODY MASS INDEX DOCD: CPT | Performed by: FAMILY MEDICINE

## 2020-09-16 NOTE — PROGRESS NOTES
Morteza Robin is a 53year old male.     CC:  Wellness Exam    HPI:  Yearly PX    Last lipid:  LIPID PANEL WITH REFLEX TO DIRECT LDLResulted: 6/12/2020 1:13 AM  Enma1 Seng Smith  Component Name Value Ref Range   CHOLESTEROL, TOTAL 176 <200 mg/dL 0   • atorvastatin 20 MG Oral Tab Take 1 tablet (20 mg total) by mouth nightly. 1 tab qhs 90 tablet 0   • losartan Potassium 50 MG Oral Tab Take 1 tablet (50 mg total) by mouth daily. 90 tablet 0   • aspirin 81 MG Oral Tab Take 81 mg by mouth.  1 qd auscultation B, no accessory muscle use  GI: normal active BS+, soft, nondistended; no HSM; no masses; no bruits; no masses; nontender, no G/R/R   PSYCH: alert and oriented x 3; affect appropriate  SKIN: not examined  BREAST: not examined/not applicable  E

## 2020-11-16 RX ORDER — ATORVASTATIN CALCIUM 20 MG/1
TABLET, FILM COATED ORAL
Qty: 90 TABLET | Refills: 2 | Status: SHIPPED | OUTPATIENT
Start: 2020-11-16 | End: 2021-08-06

## 2020-11-16 NOTE — TELEPHONE ENCOUNTER
Cholesterol Medication Protocol [id number]/16/2020 02:42 PM   Lipid panel within past 12 months Protocol Details    ALT < 80     ALT resulted within past year     Appointment within past 12 or next 3 months      Last OV 9/16/20  Last lipid 7/12/19  Last refi

## 2020-12-01 RX ORDER — AMLODIPINE BESYLATE 5 MG/1
5 TABLET ORAL DAILY
Qty: 90 TABLET | Refills: 0 | Status: SHIPPED | OUTPATIENT
Start: 2020-12-01 | End: 2021-03-02

## 2020-12-01 NOTE — TELEPHONE ENCOUNTER
Hypertension Medications Protocol [id number]/01/2020 11:53 AM   CMP or BMP in past 12 months Protocol Details    Last serum creatinine< 2.0     Appointment in past 6 or next 3 months      LOV: 9/16/20   Last Refill: 8/31/20 #90 0 RF    No future appointments

## 2020-12-30 ENCOUNTER — PATIENT MESSAGE (OUTPATIENT)
Dept: FAMILY MEDICINE CLINIC | Facility: CLINIC | Age: 55
End: 2020-12-30

## 2020-12-30 NOTE — TELEPHONE ENCOUNTER
From: Irene Umana  To: Anjelica Beckford MD  Sent: 12/30/2020 12:51 PM CST  Subject: Referral Request    Would like referral to a Dr for tendon pain in the right forearm that’s been occurring for around 10 days. Thanks.

## 2021-01-01 ENCOUNTER — EXTERNAL RECORD (OUTPATIENT)
Dept: OTHER | Age: 56
End: 2021-01-01

## 2021-01-21 RX ORDER — AMLODIPINE BESYLATE 5 MG/1
5 TABLET ORAL DAILY
COMMUNITY
Start: 2020-12-01 | End: 2021-11-26

## 2021-01-22 ENCOUNTER — OFFICE VISIT (OUTPATIENT)
Dept: CARDIOLOGY | Age: 56
End: 2021-01-22

## 2021-01-22 VITALS
HEIGHT: 71 IN | HEART RATE: 96 BPM | DIASTOLIC BLOOD PRESSURE: 84 MMHG | BODY MASS INDEX: 31.08 KG/M2 | WEIGHT: 222 LBS | SYSTOLIC BLOOD PRESSURE: 124 MMHG

## 2021-01-22 DIAGNOSIS — E78.2 HYPERLIPIDEMIA, MIXED: ICD-10-CM

## 2021-01-22 DIAGNOSIS — I10 ESSENTIAL HYPERTENSION: ICD-10-CM

## 2021-01-22 DIAGNOSIS — I25.10 CORONARY ARTERY CALCIFICATION SEEN ON CT SCAN: ICD-10-CM

## 2021-01-22 DIAGNOSIS — R07.89 OTHER CHEST PAIN: Primary | ICD-10-CM

## 2021-01-22 DIAGNOSIS — I65.23 ASYMPTOMATIC CAROTID ARTERY STENOSIS, BILATERAL: ICD-10-CM

## 2021-01-22 DIAGNOSIS — R94.31 ABNORMAL EKG: ICD-10-CM

## 2021-01-22 PROCEDURE — 99215 OFFICE O/P EST HI 40 MIN: CPT | Performed by: INTERNAL MEDICINE

## 2021-01-22 PROCEDURE — 3074F SYST BP LT 130 MM HG: CPT | Performed by: INTERNAL MEDICINE

## 2021-01-22 PROCEDURE — 3079F DIAST BP 80-89 MM HG: CPT | Performed by: INTERNAL MEDICINE

## 2021-01-22 SDOH — HEALTH STABILITY: PHYSICAL HEALTH: ON AVERAGE, HOW MANY MINUTES DO YOU ENGAGE IN EXERCISE AT THIS LEVEL?: 40 MIN

## 2021-01-22 SDOH — HEALTH STABILITY: PHYSICAL HEALTH: ON AVERAGE, HOW MANY DAYS PER WEEK DO YOU ENGAGE IN MODERATE TO STRENUOUS EXERCISE (LIKE A BRISK WALK)?: 5 DAYS

## 2021-01-22 ASSESSMENT — ENCOUNTER SYMPTOMS
BRUISES/BLEEDS EASILY: 0
HEMOPTYSIS: 0
ALLERGIC/IMMUNOLOGIC COMMENTS: NO NEW FOOD ALLERGIES
SUSPICIOUS LESIONS: 0
HEMATOCHEZIA: 0
WEIGHT LOSS: 0
FEVER: 0
CHILLS: 0
COUGH: 0
WEIGHT GAIN: 0

## 2021-01-22 ASSESSMENT — PATIENT HEALTH QUESTIONNAIRE - PHQ9
SUM OF ALL RESPONSES TO PHQ9 QUESTIONS 1 AND 2: 0
1. LITTLE INTEREST OR PLEASURE IN DOING THINGS: NOT AT ALL
2. FEELING DOWN, DEPRESSED OR HOPELESS: NOT AT ALL
CLINICAL INTERPRETATION OF PHQ2 SCORE: NO FURTHER SCREENING NEEDED
CLINICAL INTERPRETATION OF PHQ9 SCORE: NO FURTHER SCREENING NEEDED
SUM OF ALL RESPONSES TO PHQ9 QUESTIONS 1 AND 2: 0

## 2021-01-25 ENCOUNTER — TELEPHONE (OUTPATIENT)
Dept: CARDIOLOGY | Age: 56
End: 2021-01-25

## 2021-01-26 ENCOUNTER — ORDER TRANSCRIPTION (OUTPATIENT)
Dept: ADMINISTRATIVE | Facility: HOSPITAL | Age: 56
End: 2021-01-26

## 2021-01-26 DIAGNOSIS — Z01.818 PREOP EXAMINATION: Primary | ICD-10-CM

## 2021-02-01 ENCOUNTER — LAB ENCOUNTER (OUTPATIENT)
Dept: LAB | Age: 56
End: 2021-02-01
Attending: INTERNAL MEDICINE
Payer: COMMERCIAL

## 2021-02-01 DIAGNOSIS — Z01.818 PREOP EXAMINATION: ICD-10-CM

## 2021-02-02 ENCOUNTER — MED REC SCAN ONLY (OUTPATIENT)
Dept: FAMILY MEDICINE CLINIC | Facility: CLINIC | Age: 56
End: 2021-02-02

## 2021-02-02 LAB
SARS-COV-2 RNA RESP QL NAA+PROBE: NOT DETECTED
SARS-COV-2 RNA SPEC QL NAA+PROBE: NOT DETECTED
SPECIMEN SOURCE: NORMAL

## 2021-02-03 ENCOUNTER — HOSPITAL ENCOUNTER (OUTPATIENT)
Dept: CV DIAGNOSTICS | Age: 56
Discharge: HOME | End: 2021-02-03
Attending: INTERNAL MEDICINE
Payer: COMMERCIAL

## 2021-02-03 DIAGNOSIS — R94.31 ABNORMAL EKG: ICD-10-CM

## 2021-02-03 DIAGNOSIS — R07.9 CHEST PAIN: ICD-10-CM

## 2021-02-03 DIAGNOSIS — I25.10 CORONARY ARTERY CALCIFICATION SEEN ON CAT SCAN: ICD-10-CM

## 2021-02-03 PROCEDURE — 93018 CV STRESS TEST I&R ONLY: CPT | Performed by: INTERNAL MEDICINE

## 2021-02-03 PROCEDURE — 93350 STRESS TTE ONLY: CPT | Performed by: INTERNAL MEDICINE

## 2021-02-03 PROCEDURE — 93017 CV STRESS TEST TRACING ONLY: CPT | Performed by: INTERNAL MEDICINE

## 2021-02-05 ENCOUNTER — TELEPHONE (OUTPATIENT)
Dept: CARDIOLOGY | Age: 56
End: 2021-02-05

## 2021-02-16 ENCOUNTER — CLINICAL ABSTRACT (OUTPATIENT)
Dept: HEALTH INFORMATION MANAGEMENT | Age: 56
End: 2021-02-16

## 2021-03-02 RX ORDER — AMLODIPINE BESYLATE 5 MG/1
TABLET ORAL
Qty: 90 TABLET | Refills: 1 | Status: SHIPPED | OUTPATIENT
Start: 2021-03-02 | End: 2021-08-31

## 2021-07-03 RX ORDER — LOSARTAN POTASSIUM 50 MG/1
50 TABLET ORAL DAILY
Qty: 90 TABLET | Refills: 0 | Status: SHIPPED | OUTPATIENT
Start: 2021-07-03 | End: 2021-10-05

## 2021-08-06 RX ORDER — ATORVASTATIN CALCIUM 20 MG/1
TABLET, FILM COATED ORAL
Qty: 90 TABLET | Refills: 0 | Status: SHIPPED | OUTPATIENT
Start: 2021-08-06 | End: 2021-11-15

## 2021-08-06 NOTE — TELEPHONE ENCOUNTER
Please let patient or caregiver know or leave message that refill request for Lipitor has/have come from the pharmacy. The refills have been sent. The patient is due for a yearly physical and fasting labs next month. Please help schedule this for him.   Tobi Flores

## 2021-08-31 ENCOUNTER — PATIENT MESSAGE (OUTPATIENT)
Dept: FAMILY MEDICINE CLINIC | Facility: CLINIC | Age: 56
End: 2021-08-31

## 2021-08-31 RX ORDER — AMLODIPINE BESYLATE 5 MG/1
5 TABLET ORAL DAILY
Qty: 90 TABLET | Refills: 0 | Status: SHIPPED | OUTPATIENT
Start: 2021-08-31 | End: 2021-09-19

## 2021-08-31 NOTE — TELEPHONE ENCOUNTER
Please let patient or care giver know or leave message that refills have been sent. He is due for his wellness exam and fasting labs next month. Please help him schedule this.  Thanks

## 2021-08-31 NOTE — TELEPHONE ENCOUNTER
From: [facility]  To: Diane Otilia  Sent: 8/31/2021 3:08 PM CDT  Subject: Refill complete - Due for wellness exam    Martina Mims advised that refills have been sent. You are due for his wellness exam and fasting labs next month.  Please call the o

## 2021-08-31 NOTE — TELEPHONE ENCOUNTER
Wife advised of the information per Dr. Adalid Zamora. Wife verbalized understanding. Patient was sitting next to wife and she relayed this information to him.

## 2021-08-31 NOTE — TELEPHONE ENCOUNTER
Patient's wife called requesting refill for:    AMLODIPINE BESYLATE 5 MG Oral Tab 90 tablet     [facility][id number] - [address], [address] [facility] [phone], [phone]    Please advise # [phone]

## 2021-09-02 ENCOUNTER — OFFICE VISIT (OUTPATIENT)
Dept: FAMILY MEDICINE CLINIC | Facility: CLINIC | Age: 56
End: 2021-09-02
Payer: COMMERCIAL

## 2021-09-02 VITALS
HEIGHT: 70 IN | WEIGHT: 219 LBS | TEMPERATURE: 98 F | RESPIRATION RATE: 18 BRPM | BODY MASS INDEX: 31.35 KG/M2 | DIASTOLIC BLOOD PRESSURE: 82 MMHG | HEART RATE: 101 BPM | OXYGEN SATURATION: 99 % | SYSTOLIC BLOOD PRESSURE: 122 MMHG

## 2021-09-02 DIAGNOSIS — I10 ESSENTIAL HYPERTENSION: ICD-10-CM

## 2021-09-02 DIAGNOSIS — E78.5 HYPERLIPIDEMIA, UNSPECIFIED HYPERLIPIDEMIA TYPE: ICD-10-CM

## 2021-09-02 DIAGNOSIS — Z12.5 PROSTATE CANCER SCREENING: ICD-10-CM

## 2021-09-02 DIAGNOSIS — Z78.9 UNKNOWN STATUS OF IMMUNITY TO COVID-19 VIRUS: ICD-10-CM

## 2021-09-02 DIAGNOSIS — Z00.00 WELL ADULT EXAM: Primary | ICD-10-CM

## 2021-09-02 DIAGNOSIS — R93.1 ABNORMAL HEART SCORE CT: ICD-10-CM

## 2021-09-02 PROCEDURE — 3079F DIAST BP 80-89 MM HG: CPT | Performed by: FAMILY MEDICINE

## 2021-09-02 PROCEDURE — 3008F BODY MASS INDEX DOCD: CPT | Performed by: FAMILY MEDICINE

## 2021-09-02 PROCEDURE — 99396 PREV VISIT EST AGE 40-64: CPT | Performed by: FAMILY MEDICINE

## 2021-09-02 PROCEDURE — 3074F SYST BP LT 130 MM HG: CPT | Performed by: FAMILY MEDICINE

## 2021-09-02 NOTE — PROGRESS NOTES
Samuel Lazar is a 63year old male.     CC:  Patient presents with:  Smoking: per pt- wanting to quit smoking      HPI:  Yearly PX    Last lipid:  Lab Results   Component Value Date    CHOLEST 172 07/12/2019    TRIG 75 07/12/2019    HDL 97 (H) 07/12/20 Clau Swan MD;  Location: [address] [place] [address]   • TONSILLECTOMY     • VASECTOMY        Family History   Problem Relation Age of Onset   • High Blood Pressure Father    • High Cholesterol Father    • High Cholesterol Mother    • Heart Disease Father CBC, PLATELET; NO DIFFERENTIAL  - LIPID PANEL  - PSA, DIAGNOSTIC  - TSH W REFLEX TO FREE T4    2. Unknown status of immunity to COVID-19 virus  Await results   - SARS-COV-2 ANTIBODY (IGG), SPIKE, SEMI-QUANTITATIVE    3.  Prostate cancer screening  Await res

## 2021-09-10 LAB
ALT: 48 U/L (ref 9–46)
AST: 25 U/L (ref 10–35)
BUN: 15 MG/DL (ref 7–25)
CALCIUM: 9.4 MG/DL (ref 8.6–10.3)
CARBON DIOXIDE: 28 MMOL/L (ref 20–32)
CHLORIDE: 103 MMOL/L (ref 98–110)
CHOL/HDLC RATIO: 2.4 (CALC)
CHOLESTEROL, TOTAL: 209 MG/DL
CREATININE: 1.02 MG/DL (ref 0.7–1.33)
EGFR IF AFRICN AM: 95 ML/MIN/1.73M2
EGFR IF NONAFRICN AM: 82 ML/MIN/1.73M2
GLUCOSE: 114 MG/DL (ref 65–99)
HDL CHOLESTEROL: 86 MG/DL
HEMATOCRIT: 45.1 % (ref 38.5–50)
HEMOGLOBIN: 15.1 G/DL (ref 13.2–17.1)
LDL-CHOLESTEROL: 95 MG/DL (CALC)
MCH: 28.7 PG (ref 27–33)
MCHC: 33.5 G/DL (ref 32–36)
MCV: 85.6 FL (ref 80–100)
MPV: 9.2 FL (ref 7.5–12.5)
NON-HDL CHOLESTEROL: 123 MG/DL (CALC)
PLATELET COUNT: 311 THOUSAND/UL (ref 140–400)
POTASSIUM: 4.3 MMOL/L (ref 3.5–5.3)
PSA, TOTAL: 1.1 NG/ML
RDW: 13.2 % (ref 11–15)
RED BLOOD CELL COUNT: 5.27 MILLION/UL (ref 4.2–5.8)
SARS COV 2 AB (IGG) SPIKE, SEMI QN: <1 INDEX
SODIUM: 139 MMOL/L (ref 135–146)
TRIGLYCERIDES: 181 MG/DL
TSH W/REFLEX TO FT4: 3.29 MIU/L (ref 0.4–4.5)
WHITE BLOOD CELL COUNT: 9.6 THOUSAND/UL (ref 3.8–10.8)

## 2021-09-19 RX ORDER — AMLODIPINE BESYLATE 5 MG/1
5 TABLET ORAL DAILY
Qty: 90 TABLET | Refills: 1 | Status: SHIPPED | OUTPATIENT
Start: 2021-09-19

## 2021-10-05 RX ORDER — LOSARTAN POTASSIUM 50 MG/1
50 TABLET ORAL DAILY
Qty: 90 TABLET | Refills: 3 | Status: SHIPPED | OUTPATIENT
Start: 2021-10-05

## 2021-11-15 RX ORDER — ATORVASTATIN CALCIUM 20 MG/1
TABLET, FILM COATED ORAL
Qty: 90 TABLET | Refills: 0 | Status: SHIPPED | OUTPATIENT
Start: 2021-11-15

## 2021-11-15 NOTE — TELEPHONE ENCOUNTER
Cholesterol Medication Protocol Passed 11/14/2021 07:56 PM   Protocol Details  ALT < 80    ALT resulted within past year    Lipid panel within past 12 months    Appointment within past 12 or next 3 months        Refilled per protocol and sent to pharmacy.

## 2021-12-02 ENCOUNTER — TELEPHONE (OUTPATIENT)
Dept: FAMILY MEDICINE CLINIC | Facility: CLINIC | Age: 56
End: 2021-12-02

## 2021-12-02 NOTE — TELEPHONE ENCOUNTER
[facility]OU[facility] CALLED AND ADV NEEDS REFILL OF     AMLODIPINE 5 MG Oral Tab    [facility] ADV PT IS OUT OF MEDS    PLEASE SEND TO [facility]     THANK YOU

## 2021-12-02 NOTE — TELEPHONE ENCOUNTER
Patient advised that there is a refill on file at [facility]. They will be getting the prescription ready.

## 2022-01-21 ENCOUNTER — APPOINTMENT (OUTPATIENT)
Dept: CARDIOLOGY | Age: 57
End: 2022-01-21

## 2022-01-25 ENCOUNTER — MED REC SCAN ONLY (OUTPATIENT)
Dept: FAMILY MEDICINE CLINIC | Facility: CLINIC | Age: 57
End: 2022-01-25

## 2022-02-18 RX ORDER — ATORVASTATIN CALCIUM 20 MG/1
TABLET, FILM COATED ORAL
Qty: 90 TABLET | Refills: 1 | Status: SHIPPED | OUTPATIENT
Start: 2022-02-18

## 2022-02-20 ENCOUNTER — PATIENT MESSAGE (OUTPATIENT)
Dept: FAMILY MEDICINE CLINIC | Facility: CLINIC | Age: 57
End: 2022-02-20

## 2022-02-21 RX ORDER — LOSARTAN POTASSIUM 100 MG/1
TABLET ORAL
COMMUNITY
Start: 2022-02-16

## 2022-02-21 NOTE — TELEPHONE ENCOUNTER
From: Patti Lemos  To: Anton Gaming MD  Sent: 2/20/2022 6:51 PM CST  Subject: Back Doctor    Hi Dr Emily Hinkle! Martita Massing is having really back back pain and we cant remember the back Dr/surgeon that you recommend way back in 2008.    Any help will be appreciated, thanks!    -Oleg Bolton

## 2022-06-02 RX ORDER — AMLODIPINE BESYLATE 5 MG/1
5 TABLET ORAL DAILY
Qty: 90 TABLET | Refills: 0 | Status: SHIPPED | OUTPATIENT
Start: 2022-06-02

## 2022-08-08 NOTE — TELEPHONE ENCOUNTER
LOV 09/02/2021   Last labs 09/09/2021    Last refill on 06/02/2022, for #90 tabs, with 0 refills  AMLODIPINE 5 MG Oral Tab    Last refill on (Historical), for #?, with ? refills  losartan 100 MG Oral Tab  Hypertension Medications Protocol Failed 08/08/2022 12:15 PM   Protocol Details  Appointment in past 6 or next 3 months    CMP or BMP in past 12 months    Last serum creatinine< 2.0     Last refill on 02/18/2022, for #90 tabs, with 1 refills  ATORVASTATIN 20 MG Oral Tab  Cholesterol Medication Protocol Passed 08/08/2022 12:15 PM   Protocol Details  ALT < 80    ALT resulted within past year    Lipid panel within past 12 months    Appointment within past 12 or next 3 months       No future appointments. Order(s) pending, please review. Thank you.

## 2022-08-08 NOTE — TELEPHONE ENCOUNTER
[facility] [address], [address] [facility] [phone], [address] [facility]   Phone: [phone] Fax: [phone]   Hours: Not open 24 hours     PATIENT REQUESTING REFILL ON:  AMLODIPINE   ATORVASTATIN  LOSARTAN    PATIENT MOVED TO [place] BUT WILL BE IN TOWN TOMORROW AND PART OF Wednesday.

## 2022-08-09 RX ORDER — ATORVASTATIN CALCIUM 20 MG/1
20 TABLET, FILM COATED ORAL NIGHTLY
Qty: 90 TABLET | Refills: 0 | Status: SHIPPED | OUTPATIENT
Start: 2022-08-09

## 2022-08-09 RX ORDER — LOSARTAN POTASSIUM 100 MG/1
100 TABLET ORAL DAILY
Qty: 90 TABLET | Refills: 0 | Status: SHIPPED | OUTPATIENT
Start: 2022-08-09 | End: 2022-11-07

## 2022-08-09 RX ORDER — AMLODIPINE BESYLATE 5 MG/1
5 TABLET ORAL DAILY
Qty: 90 TABLET | Refills: 0 | Status: SHIPPED | OUTPATIENT
Start: 2022-08-09

## 2022-08-09 NOTE — TELEPHONE ENCOUNTER
Please let patient or care giver know or leave message that refills have been sent. Please have him see me back in the next 1-2 months for an annual physical and fasting labs.    Thanks

## 2022-11-16 NOTE — TELEPHONE ENCOUNTER
urinalysis positive . Culture, Urine pending  continue with anitbiotic IV ceftriaxone  11/8 UC with klebsiella; s/p x5 doses CTX - sensitive    11/14 - completed levoquin, has left flank/ back pain. Check UA, Warren given, warmpack.   11/16- see above   From: Colleen Uribe  To: Beena Tinajero MD  Sent: 5/22/2020 9:41 AM CDT  Subject: Non-Urgent Medical Question     requested blood pressure numbers for the week:     May 18= 140/97 9am, 9 pm = 135/77    May 19= 127/83 7am, 7 pm = 135/78    May 20= 142/

## 2022-11-17 RX ORDER — AMLODIPINE BESYLATE 5 MG/1
5 TABLET ORAL DAILY
Qty: 90 TABLET | Refills: 0 | Status: SHIPPED | OUTPATIENT
Start: 2022-11-17

## 2022-11-17 RX ORDER — ATORVASTATIN CALCIUM 20 MG/1
20 TABLET, FILM COATED ORAL NIGHTLY
Qty: 90 TABLET | Refills: 0 | Status: SHIPPED | OUTPATIENT
Start: 2022-11-17

## 2022-11-17 NOTE — TELEPHONE ENCOUNTER
Cholesterol Medication Protocol Failed 11/17/2022 08:24 AM   Protocol Details  ALT < 80    ALT resulted within past year    Lipid panel within past 12 months    Appointment within past 12 or next 3 months     Hypertension Medications Protocol Failed 11/17/2022 08:24 AM   Protocol Details  CMP or BMP in past 12 months    Appointment in past 6 or next 3 months    Last serum creatinine< 2.0     Routing to provider per protocol. amLODIPine 5 MG Oral Tab  Last refilled on 8/9/22 for #90  with 0 rf.     atorvastatin 20 MG Oral Tab  Last refilled on 8/9/22 for #90  with 0 rf. Last labs 9/9/21. Last seen on 9/2/21. Future Appointments   Date Time Provider Kourtney Almendarez   12/19/2022  8:00 AM [facility] [place]          Thank you.

## 2022-11-17 NOTE — TELEPHONE ENCOUNTER
Please let patient or care giver know or leave message that refills have been sent. The patient is due for a yearly physical and fasting labs. Please help schedule this in the next month or so.   Thanks

## 2022-11-17 NOTE — TELEPHONE ENCOUNTER
atorvastatin 20 MG Oral Tab        losartan 100 MG Oral Tab [[id number]]        amLODIPine 5 MG Oral Tab      Pt is needing sent to  [facility], [address] [place] [address],  [address] [phone], [phone]      Wife advised has upcoming appointment with heart dr in January and upcoming lab work.

## 2022-11-18 RX ORDER — LOSARTAN POTASSIUM 100 MG/1
TABLET ORAL
Qty: 90 TABLET | Refills: 0 | Status: SHIPPED | OUTPATIENT
Start: 2022-11-18

## 2022-11-18 NOTE — TELEPHONE ENCOUNTER
Pt failed refill protocol for the following reasons:     Hypertension Medications Protocol Failed 11/18/2022 11:30 AM   Protocol Details  CMP or BMP in past 12 months    Appointment in past 6 or next 3 months            Last refill: 8/09/2022 #90 with 0 refills  Last appt: 9/02/2021  Next appt: Future Appointments   Date Time Provider Kourtney Almendarez   12/19/2022  8:00 AM [facility] [place]         Forward to Dr. Jorge Ramon, please advise on refills. Thank you.

## 2022-12-02 ENCOUNTER — TELEPHONE (OUTPATIENT)
Dept: FAMILY MEDICINE CLINIC | Facility: CLINIC | Age: 57
End: 2022-12-02

## 2022-12-02 NOTE — TELEPHONE ENCOUNTER
I called spouse to verify her PCP. While speaking with her she advised that they have since moved to [place]. She wanted to know if Dr had any recommendations for PCP in [place]? I advised her to call her insurance. She wanted to know if Dr. Nemo Holland would still pt when they come into [facility] to see kids?   Is that ok with ?

## 2022-12-02 NOTE — TELEPHONE ENCOUNTER
Unfortunately we will not be able to see her in this regard. I do not know of any doctors in [place].   I wish her well in [place]  Thanks

## 2022-12-07 ENCOUNTER — PATIENT MESSAGE (OUTPATIENT)
Dept: FAMILY MEDICINE CLINIC | Facility: CLINIC | Age: 57
End: 2022-12-07

## 2022-12-07 NOTE — TELEPHONE ENCOUNTER
Spoke with patient, he states he lives in [place] but comes to town often. He does not want to switch to a PCP in [place]. Has an appt on 12/9/22 for labs in [facility]. Patient states he and his wife would like to continue to see [facility].

## 2022-12-07 NOTE — TELEPHONE ENCOUNTER
From: Valeria Hernandez  To: Gi Lazaro MD  Sent: 12/7/2022 8:15 AM CST  Subject: Patient Status    Please ask Dr. Sue Matamoros to give me a call at his convenience. Nothing urgent. [phone]. Thanks.

## 2022-12-07 NOTE — TELEPHONE ENCOUNTER
I'll need to see them in office yearly. Juice Rogers has not been seen in over one year. Please schedule a physical for him. His wife has not been see in over 2.5 years. At the 3 year makr of not seeing a patient they are no longer considered an active patient and I am not taking new patients. I recommend seeing her in the very near future for a physical so she stay an active patient.     Thanks

## 2022-12-07 NOTE — TELEPHONE ENCOUNTER
Left detailed message to voicemail (per verbal release form consent with confirmed identifying message) of Doctor's note below.  Patient advised to call office back and schedule Yearly px with labs

## 2022-12-20 ENCOUNTER — LAB ENCOUNTER (OUTPATIENT)
Dept: LAB | Age: 57
End: 2022-12-20
Attending: INTERNAL MEDICINE
Payer: COMMERCIAL

## 2022-12-20 DIAGNOSIS — R93.1 AGATSTON CORONARY ARTERY CALCIUM SCORE BETWEEN 100 AND 400: Primary | ICD-10-CM

## 2022-12-20 DIAGNOSIS — E78.2 HYPERLIPIDEMIA, MIXED: ICD-10-CM

## 2022-12-20 LAB
ALBUMIN SERPL-MCNC: 4 G/DL (ref 3.4–5)
ALBUMIN/GLOB SERPL: 1 {RATIO} (ref 1–2)
ALP LIVER SERPL-CCNC: 54 U/L
ALT SERPL-CCNC: 40 U/L
ANION GAP SERPL CALC-SCNC: 8 MMOL/L (ref 0–18)
AST SERPL-CCNC: 23 U/L (ref 15–37)
BILIRUB SERPL-MCNC: 0.4 MG/DL (ref 0.1–2)
BUN BLD-MCNC: 16 MG/DL (ref 7–18)
CALCIUM BLD-MCNC: 9.8 MG/DL (ref 8.5–10.1)
CHLORIDE SERPL-SCNC: 106 MMOL/L (ref 98–112)
CHOLEST SERPL-MCNC: 211 MG/DL (ref ?–200)
CO2 SERPL-SCNC: 27 MMOL/L (ref 21–32)
CREAT BLD-MCNC: 1.06 MG/DL
FASTING PATIENT LIPID ANSWER: YES
FASTING STATUS PATIENT QL REPORTED: YES
GFR SERPLBLD BASED ON 1.73 SQ M-ARVRAT: 81 ML/MIN/1.73M2 (ref 60–?)
GLOBULIN PLAS-MCNC: 3.9 G/DL (ref 2.8–4.4)
GLUCOSE BLD-MCNC: 105 MG/DL (ref 70–99)
HDLC SERPL-MCNC: 116 MG/DL (ref 40–59)
LDLC SERPL CALC-MCNC: 79 MG/DL (ref ?–100)
NONHDLC SERPL-MCNC: 95 MG/DL (ref ?–130)
OSMOLALITY SERPL CALC.SUM OF ELEC: 294 MOSM/KG (ref 275–295)
POTASSIUM SERPL-SCNC: 3.8 MMOL/L (ref 3.5–5.1)
PROT SERPL-MCNC: 7.9 G/DL (ref 6.4–8.2)
SODIUM SERPL-SCNC: 141 MMOL/L (ref 136–145)
TRIGL SERPL-MCNC: 94 MG/DL (ref 30–149)
VLDLC SERPL CALC-MCNC: 15 MG/DL (ref 0–30)

## 2022-12-20 PROCEDURE — 80061 LIPID PANEL: CPT

## 2022-12-20 PROCEDURE — 80053 COMPREHEN METABOLIC PANEL: CPT

## 2022-12-20 PROCEDURE — 36415 COLL VENOUS BLD VENIPUNCTURE: CPT

## 2023-02-06 VITALS — SYSTOLIC BLOOD PRESSURE: 120 MMHG | DIASTOLIC BLOOD PRESSURE: 70 MMHG

## 2023-11-21 NOTE — TELEPHONE ENCOUNTER
I think the pharmacy gave him an old rx. He is correct, he should be on Losartan 100 mg daily. New rx sent. His BP has trended down nicely. Keep track of it and send me some more readings in 2 weeks.   Thanks a flutter with 2:1 av conduction

## 2024-03-21 ENCOUNTER — MED REC SCAN ONLY (OUTPATIENT)
Dept: FAMILY MEDICINE CLINIC | Facility: CLINIC | Age: 59
End: 2024-03-21

## 2024-04-08 PROBLEM — E78.2 HYPERLIPIDEMIA, MIXED: Status: ACTIVE | Noted: 2018-04-10

## 2024-07-31 VITALS — SYSTOLIC BLOOD PRESSURE: 126 MMHG | DIASTOLIC BLOOD PRESSURE: 84 MMHG
